# Patient Record
Sex: FEMALE | Race: BLACK OR AFRICAN AMERICAN | URBAN - METROPOLITAN AREA
[De-identification: names, ages, dates, MRNs, and addresses within clinical notes are randomized per-mention and may not be internally consistent; named-entity substitution may affect disease eponyms.]

---

## 2024-07-09 ENCOUNTER — OFFICE VISIT (OUTPATIENT)
Age: 79
End: 2024-07-09

## 2024-07-09 VITALS
DIASTOLIC BLOOD PRESSURE: 89 MMHG | WEIGHT: 105 LBS | HEART RATE: 67 BPM | OXYGEN SATURATION: 100 % | RESPIRATION RATE: 16 BRPM | TEMPERATURE: 97.8 F | SYSTOLIC BLOOD PRESSURE: 176 MMHG

## 2024-07-09 DIAGNOSIS — S13.9XXA NECK SPRAIN, INITIAL ENCOUNTER: Primary | ICD-10-CM

## 2024-07-09 NOTE — PATIENT INSTRUCTIONS
Patient was seen today for some left sided neck pain and tenderness which is consistent with a cervical neck sprain  I have printed out some gentle stretches and exercises that I would like for you to do as tolerated  Ibuprofen and Tylenol over-the-counter as needed for pain  Warm heating pads to the affected area for 15 to 20 minutes at a time several times per day  I do not feel any significant swelling, I have low suspicion for lymphadenopathy or malignancy  Please monitor symptoms carefully and follow-up with your primary care provider if symptoms persist

## 2024-07-09 NOTE — PROGRESS NOTES
Veena Burk (:  1945) is a 78 y.o. female,New patient, here for evaluation of the following chief complaint(s):  Neck Swelling (C/o of knot on back of neck. Sx 3 days.)      Assessment & Plan :  Visit Diagnoses and Associated Orders       Neck sprain, initial encounter    -  Primary               Patient was seen today for some left sided neck pain and tenderness which is consistent with a cervical neck sprain  I have printed out some gentle stretches and exercises that I would like for you to do as tolerated  Ibuprofen and Tylenol over-the-counter as needed for pain  Warm heating pads to the affected area for 15 to 20 minutes at a time several times per day  I do not feel any significant swelling, I have low suspicion for lymphadenopathy or malignancy  Please monitor symptoms carefully and follow-up with your primary care provider if symptoms persist       Subjective :  HPI     78 y.o. female presents with symptoms of left-sided neck pain and tenderness for the past several days.  She thought she felt a little knot on her neck several days ago.  She reports pain with extension of her neck and when turning her head to the right.  She denies any notable trauma or injury, but states that she noticed the discomfort when sitting on a park bench the other day.  She denies fevers, chills, body aches or symptoms of illness or infection.  She denies recent weight loss or night sweats.  Denies headaches, dizziness, lightheadedness or fainting spells         Vitals:    24 1641   BP: (!) 176/89   Site: Right Upper Arm   Position: Sitting   Cuff Size: Medium Adult   Pulse: 67   Resp: 16   Temp: 97.8 °F (36.6 °C)   TempSrc: Oral   SpO2: 100%   Weight: 47.6 kg (105 lb)       No results found for this visit on 24.      Objective   Physical Exam  Vitals and nursing note reviewed.   Constitutional:       General: She is not in acute distress.     Appearance: Normal appearance. She is not